# Patient Record
(demographics unavailable — no encounter records)

---

## 2024-10-23 NOTE — REASON FOR VISIT
[de-identified] : right radiocephalic AVF [de-identified] : 10/1/24 [de-identified] : 3 [de-identified] : 62 yo male returns in follow up. The patient is s/p a right radiocephalic AVF approx. 3 weeks ago. He reports he is doing well post procedure. He denies numbness or swelling in his hand. He continues to undergo HD via RIJ PermCath without difficulty. Patient has left side PPM. The patient takes aspirin daily. He presents for post-op check.

## 2024-10-23 NOTE — ADDENDUM
[FreeTextEntry1] :  All medical record entries made by the scribe were at my, CHINA KIRKLAND, direction and personally dictated by me on 10/23/2024. I have reviewed the chart and agree that the record accurately reflects my personal performance of the history, physical exam, assessment, and plan. I have also personally directed, reviewed, and agreed with the chart.   Documented by Vanessa Suazo acting as a scribe for CHINA KIRKLAND on 10/23/2024.

## 2024-10-23 NOTE — PHYSICAL EXAM
[Normal Breath Sounds] : Normal breath sounds [Alert] : alert [Oriented to Person] : oriented to person [Oriented to Place] : oriented to place [Oriented to Time] : oriented to time [JVD] : no jugular venous distention  [de-identified] : Awake and alert [FreeTextEntry1] : palpable thrill in AVF [de-identified] : palpable thrill in fistula, no edema of the RUE, incision  healing appropriately  [de-identified] :  No gross motor or sensory deficits. [de-identified] : Appropriate